# Patient Record
Sex: FEMALE | Race: WHITE
[De-identification: names, ages, dates, MRNs, and addresses within clinical notes are randomized per-mention and may not be internally consistent; named-entity substitution may affect disease eponyms.]

---

## 2018-01-07 ENCOUNTER — HOSPITAL ENCOUNTER (EMERGENCY)
Dept: HOSPITAL 62 - ER | Age: 18
End: 2018-01-07
Payer: OTHER GOVERNMENT

## 2018-01-07 DIAGNOSIS — J02.9: ICD-10-CM

## 2018-01-07 DIAGNOSIS — R05: ICD-10-CM

## 2018-01-07 DIAGNOSIS — R07.9: ICD-10-CM

## 2018-01-07 DIAGNOSIS — J40: ICD-10-CM

## 2018-01-07 DIAGNOSIS — R06.02: ICD-10-CM

## 2018-01-07 DIAGNOSIS — J06.9: Primary | ICD-10-CM

## 2018-01-07 PROCEDURE — 71046 X-RAY EXAM CHEST 2 VIEWS: CPT

## 2018-01-07 PROCEDURE — 93010 ELECTROCARDIOGRAM REPORT: CPT

## 2018-01-07 PROCEDURE — 93005 ELECTROCARDIOGRAM TRACING: CPT

## 2018-01-07 PROCEDURE — 99285 EMERGENCY DEPT VISIT HI MDM: CPT

## 2018-01-07 NOTE — EKG REPORT
SEVERITY:- BORDERLINE ECG -

SINUS RHYTHM

BORDERLINE PROLONGED QT INTERVAL

:

Confirmed by: Brenda Reid 07-Jan-2018 22:12:33

## 2018-01-08 NOTE — RADIOLOGY REPORT (SQ)
EXAM DESCRIPTION:  CHEST PA/LAT



COMPLETED DATE/TIME:  1/8/2018 1:41 am



REASON FOR STUDY:  SOB



COMPARISON:  None.



EXAM PARAMETERS:  NUMBER OF VIEWS: two views

TECHNIQUE: Digital Frontal and Lateral radiographic views of the chest acquired.

RADIATION DOSE: NA

LIMITATIONS: none



FINDINGS:  LUNGS AND PLEURA: No opacities, masses or pneumothorax. No pleural effusion.

MEDIASTINUM AND HILAR STRUCTURES: No masses or contour abnormalities.

HEART AND VASCULAR STRUCTURES: Heart normal size.  No evidence for failure.

BONES: No acute findings.

HARDWARE: None in the chest.

OTHER: No other significant finding.



IMPRESSION:  NO ACUTE RADIOGRAPHIC FINDING IN THE CHEST.



TECHNICAL DOCUMENTATION:  JOB ID:  [id number]

 2011 [email or web address]- All Rights Reserved

## 2023-09-21 ENCOUNTER — HOSPITAL ENCOUNTER (OUTPATIENT)
Dept: DATA CONVERSION | Facility: HOSPITAL | Age: 23
Discharge: HOME | End: 2023-09-21

## 2023-09-21 DIAGNOSIS — Z01.419 ENCOUNTER FOR GYNECOLOGICAL EXAMINATION (GENERAL) (ROUTINE) WITHOUT ABNORMAL FINDINGS: ICD-10-CM

## 2023-09-28 ENCOUNTER — HOSPITAL ENCOUNTER (OUTPATIENT)
Dept: DATA CONVERSION | Facility: HOSPITAL | Age: 23
Discharge: HOME | End: 2023-09-28

## 2023-09-28 DIAGNOSIS — N92.6 IRREGULAR MENSTRUATION, UNSPECIFIED: ICD-10-CM

## 2023-09-28 LAB
LH SERPL-ACNC: 17.2 MU/ML
PROLACTIN SERPL-MCNC: 15.5 NG/ML (ref 4.8–23.3)
TESTOST SERPL-MCNC: 35 NG/DL (ref 6–62)
TSH SERPL DL<=0.05 MIU/L-ACNC: 1.5 MIU/L (ref 0.27–4.2)

## 2023-10-02 LAB — FSH SERPL-ACNC: NORMAL MU/ML

## 2024-05-17 ENCOUNTER — TELEPHONE (OUTPATIENT)
Dept: OBSTETRICS AND GYNECOLOGY | Facility: CLINIC | Age: 24
End: 2024-05-17

## 2024-05-17 NOTE — TELEPHONE ENCOUNTER
Last annual 09/21/2023. Patient calling stating yesterday she started experiencing a lot of vaginal itching, some thick discharge and burning. Denies an odor, or changes in any soaps or detergents. Diflucan 150 mg tablet, #1 take now with 0 refills called into [facility] on file. Advised patient to call the office if symptoms persist to come in for an exam.